# Patient Record
Sex: FEMALE | Race: WHITE | NOT HISPANIC OR LATINO | URBAN - METROPOLITAN AREA
[De-identification: names, ages, dates, MRNs, and addresses within clinical notes are randomized per-mention and may not be internally consistent; named-entity substitution may affect disease eponyms.]

---

## 2023-01-05 ENCOUNTER — OFFICE VISIT (OUTPATIENT)
Dept: URGENT CARE | Facility: CLINIC | Age: 38
End: 2023-01-05

## 2023-01-05 VITALS
RESPIRATION RATE: 19 BRPM | HEIGHT: 64 IN | SYSTOLIC BLOOD PRESSURE: 141 MMHG | WEIGHT: 160 LBS | HEART RATE: 76 BPM | BODY MASS INDEX: 27.31 KG/M2 | OXYGEN SATURATION: 100 % | DIASTOLIC BLOOD PRESSURE: 65 MMHG | TEMPERATURE: 97.6 F

## 2023-01-05 DIAGNOSIS — J01.90 ACUTE SINUSITIS, RECURRENCE NOT SPECIFIED, UNSPECIFIED LOCATION: Primary | ICD-10-CM

## 2023-01-05 NOTE — PROGRESS NOTES
[address] [facility]        NAME: Erin Nguyen is a 44 y o  female  : 1985    MRN: [id number]  DATE: 2023  TIME: 9:38 AM    Assessment and Plan   Acute sinusitis, recurrence not specified, unspecified location [J01 90]  1  Acute sinusitis, recurrence not specified, unspecified location            Declined COVID testing  Patient Instructions     Vitamin D3 2000 IU daily  Vitamin C 1000mg twice per day  Multivitamin daily  Fluids and rest  Over the counter cold medication as needed (EX: flonase)  Warm compresses over sinuses and steam treatments  Aleve at night  Netti pot at night (follow instructions carefully)  Follow up with PCP in 3-5 days  Proceed to  ER if symptoms worsen  Chief Complaint     Chief Complaint   Patient presents with   • Cold Like Symptoms     Pt reports productive cough with sinus congestion since 22         History of Present Illness       URI   This is a new problem  Episode onset: 4d  There has been no fever  Associated symptoms include congestion and coughing  Pertinent negatives include no abdominal pain, diarrhea, ear pain, headaches, nausea, rash, rhinorrhea, sinus pain, sneezing, sore throat, vomiting or wheezing  Treatments tried: decongestant  Review of Systems   Review of Systems   Constitutional: Negative for chills, fatigue and fever  HENT: Positive for congestion, sinus pressure and voice change (improving)  Negative for ear discharge, ear pain, postnasal drip, rhinorrhea, sinus pain, sneezing, sore throat and trouble swallowing  Respiratory: Positive for cough  Negative for chest tightness, shortness of breath and wheezing  Gastrointestinal: Negative for abdominal pain, diarrhea, nausea and vomiting  Musculoskeletal: Negative for myalgias  Skin: Negative for rash  Neurological: Negative for light-headedness and headaches  Current Medications     No current outpatient medications on file      Current Allergies Allergies as of 01/05/2023   • (No Known Allergies)            The following portions of the patient's history were reviewed and updated as appropriate: allergies, current medications, past family history, past medical history, past social history, past surgical history and problem list      History reviewed  No pertinent past medical history  History reviewed  No pertinent surgical history  History reviewed  No pertinent family history  Medications have been verified  Objective   /65   Pulse 76   Temp 97 6 °F (36 4 °C)   Resp 19   Ht 5' 4" (1 626 m)   Wt 72 6 kg (160 lb)   LMP 01/03/2023   SpO2 100%   BMI 27 46 kg/m²   Patient's last menstrual period was 01/03/2023  Physical Exam     Physical Exam  Vitals reviewed  Constitutional:       General: She is not in acute distress  Appearance: She is well-developed  She is not diaphoretic  HENT:      Head: Normocephalic  Comments: L maxillary sinus TTP     Right Ear: Tympanic membrane and external ear normal       Left Ear: Tympanic membrane and external ear normal       Nose: Nose normal       Mouth/Throat:      Pharynx: No oropharyngeal exudate or posterior oropharyngeal erythema  Eyes:      Conjunctiva/sclera: Conjunctivae normal    Cardiovascular:      Rate and Rhythm: Normal rate and regular rhythm  Heart sounds: Normal heart sounds  No murmur heard  No friction rub  No gallop  Pulmonary:      Effort: Pulmonary effort is normal  No respiratory distress  Breath sounds: Normal breath sounds  No wheezing, rhonchi or rales  Lymphadenopathy:      Cervical: No cervical adenopathy  Skin:     General: Skin is warm  Neurological:      Mental Status: She is alert and oriented to person, place, and time  Psychiatric:         Behavior: Behavior normal          Thought Content:  Thought content normal          Judgment: Judgment normal

## 2023-01-05 NOTE — PATIENT INSTRUCTIONS
Vitamin D3 2000 IU daily  Vitamin C 1000mg twice per day  Multivitamin daily  Fluids and rest  Over the counter cold medication as needed (EX: flonase)  Warm compresses over sinuses and steam treatments  Aleve at night  Netti pot at night (follow instructions carefully)  Follow up with PCP in 3-5 days  Proceed to  ER if symptoms worsen

## 2025-07-11 ENCOUNTER — OFFICE VISIT (OUTPATIENT)
Dept: FAMILY MEDICINE CLINIC | Facility: CLINIC | Age: 40
End: 2025-07-11

## 2025-07-11 VITALS
RESPIRATION RATE: 16 BRPM | DIASTOLIC BLOOD PRESSURE: 76 MMHG | HEART RATE: 74 BPM | BODY MASS INDEX: 26.26 KG/M2 | HEIGHT: 64 IN | OXYGEN SATURATION: 100 % | SYSTOLIC BLOOD PRESSURE: 110 MMHG | WEIGHT: 153.8 LBS

## 2025-07-11 DIAGNOSIS — Z12.39 ENCOUNTER FOR SCREENING FOR MALIGNANT NEOPLASM OF BREAST, UNSPECIFIED SCREENING MODALITY: ICD-10-CM

## 2025-07-11 DIAGNOSIS — Z00.00 ANNUAL PHYSICAL EXAM: Primary | ICD-10-CM

## 2025-07-11 DIAGNOSIS — Z01.419 ENCOUNTER FOR GYNECOLOGICAL EXAMINATION WITHOUT ABNORMAL FINDING: ICD-10-CM

## 2025-07-11 DIAGNOSIS — Z13.220 LIPID SCREENING: ICD-10-CM

## 2025-07-11 PROCEDURE — 99386 PREV VISIT NEW AGE 40-64: CPT | Performed by: FAMILY MEDICINE

## 2025-07-11 NOTE — PROGRESS NOTES
"Name: Maxine Dickerson      : 1985      MRN: [id number]  Encounter Provider: Robert Budinetz, MD  Encounter Date: 2025   Encounter department: [facility]  :  Assessment & Plan  Annual physical exam  Reviewed age-appropriate health maintenance and preventive care.         Encounter for gynecological examination without abnormal finding  Ref to gyn  Orders:    Mammo screening bilateral w 3d and cad; Future    Ambulatory Referral to Obstetrics / Gynecology; Future    Encounter for screening for malignant neoplasm of breast, unspecified screening modality  Mammogram ordered  Orders:    Mammo screening bilateral w 3d and cad; Future    Ambulatory Referral to Obstetrics / Gynecology; Future           History of Present Illness   Maxine is here for her initial visit.  She is a very healthy and pleasant 40-year-old woman.  She is a teacher locally.  She has no chronic medical problems takes no chronic medications.  She does need a mammogram and a gynecologic referral as well as routine annual blood work which I ordered.  She does seem to get menstrual headaches or headaches related to her menstrual cycle 1 to 3 days prior to the cycle.  Working to start with Aleve about 5 days prior to her menstrual cycle and she can stop during her cycle.  She does not smoke or drink she understands importance of healthy diet and exercise she is physically active as her family is involved in a tree farm and she is constantly outdoors doing some routine activity.  Otherwise she is up-to-date on everything no family history of colon cancer.      Review of Systems   Respiratory: Negative.     Cardiovascular: Negative.        Objective   /76 (BP Location: Right arm, Patient Position: Sitting, Cuff Size: Standard)   Pulse 74   Resp 16   Ht 5' 4\" (1.626 m)   Wt 69.8 kg (153 lb 12.8 oz)   SpO2 100%   BMI 26.40 kg/m²      Physical Exam  Vitals and nursing note reviewed.   Constitutional:       General: " She is not in acute distress.     Appearance: She is well-developed.   HENT:      Head: Normocephalic and atraumatic.     Eyes:      Conjunctiva/sclera: Conjunctivae normal.       Cardiovascular:      Rate and Rhythm: Normal rate and regular rhythm.      Heart sounds: No murmur heard.  Pulmonary:      Effort: Pulmonary effort is normal. No respiratory distress.      Breath sounds: Normal breath sounds.   Abdominal:      Palpations: Abdomen is soft.      Tenderness: There is no abdominal tenderness.     Musculoskeletal:         General: No swelling.      Cervical back: Neck supple.     Skin:     General: Skin is warm and dry.      Capillary Refill: Capillary refill takes less than 2 seconds.     Neurological:      Mental Status: She is alert.     Psychiatric:         Mood and Affect: Mood normal.